# Patient Record
Sex: MALE | Race: WHITE | NOT HISPANIC OR LATINO | Employment: FULL TIME | URBAN - METROPOLITAN AREA
[De-identification: names, ages, dates, MRNs, and addresses within clinical notes are randomized per-mention and may not be internally consistent; named-entity substitution may affect disease eponyms.]

---

## 2018-02-16 ENCOUNTER — HISTORICAL (OUTPATIENT)
Dept: LAB | Facility: HOSPITAL | Age: 48
End: 2018-02-16

## 2018-02-16 LAB
ABS NEUT (OLG): 3.19 X10(3)/MCL (ref 2.1–9.2)
ALBUMIN SERPL-MCNC: 4.1 GM/DL (ref 3.4–5)
ALBUMIN/GLOB SERPL: 1.2 RATIO (ref 1.1–2)
ALP SERPL-CCNC: 71 UNIT/L (ref 50–136)
ALT SERPL-CCNC: 40 UNIT/L (ref 12–78)
AST SERPL-CCNC: 21 UNIT/L (ref 15–37)
BASOPHILS # BLD AUTO: 0 X10(3)/MCL (ref 0–0.2)
BASOPHILS NFR BLD AUTO: 1 %
BILIRUB SERPL-MCNC: 0.9 MG/DL (ref 0.2–1)
BILIRUBIN DIRECT+TOT PNL SERPL-MCNC: 0.2 MG/DL (ref 0–0.5)
BILIRUBIN DIRECT+TOT PNL SERPL-MCNC: 0.7 MG/DL (ref 0–0.8)
BUN SERPL-MCNC: 17 MG/DL (ref 7–18)
CALCIUM SERPL-MCNC: 9.3 MG/DL (ref 8.5–10.1)
CHLORIDE SERPL-SCNC: 104 MMOL/L (ref 98–107)
CHOLEST SERPL-MCNC: 228 MG/DL (ref 0–200)
CHOLEST/HDLC SERPL: 7.6 {RATIO} (ref 0–5)
CO2 SERPL-SCNC: 29 MMOL/L (ref 21–32)
CREAT SERPL-MCNC: 0.9 MG/DL (ref 0.7–1.3)
EOSINOPHIL # BLD AUTO: 0.1 X10(3)/MCL (ref 0–0.9)
EOSINOPHIL NFR BLD AUTO: 2 %
ERYTHROCYTE [DISTWIDTH] IN BLOOD BY AUTOMATED COUNT: 12.3 % (ref 11.5–17)
GLOBULIN SER-MCNC: 3.5 GM/DL (ref 2.4–3.5)
GLUCOSE SERPL-MCNC: 80 MG/DL (ref 74–106)
HCT VFR BLD AUTO: 48.5 % (ref 42–52)
HDLC SERPL-MCNC: 30 MG/DL (ref 35–60)
HGB BLD-MCNC: 15.9 GM/DL (ref 14–18)
LDLC SERPL CALC-MCNC: 162 MG/DL (ref 0–129)
LYMPHOCYTES # BLD AUTO: 1.2 X10(3)/MCL (ref 0.6–4.6)
LYMPHOCYTES NFR BLD AUTO: 24 %
MCH RBC QN AUTO: 29.9 PG (ref 27–31)
MCHC RBC AUTO-ENTMCNC: 32.8 GM/DL (ref 33–36)
MCV RBC AUTO: 91.3 FL (ref 80–94)
MONOCYTES # BLD AUTO: 0.6 X10(3)/MCL (ref 0.1–1.3)
MONOCYTES NFR BLD AUTO: 11 %
NEUTROPHILS # BLD AUTO: 3.19 X10(3)/MCL (ref 2.1–9.2)
NEUTROPHILS NFR BLD AUTO: 61 %
PLATELET # BLD AUTO: 225 X10(3)/MCL (ref 130–400)
PMV BLD AUTO: 9.5 FL (ref 9.4–12.4)
POTASSIUM SERPL-SCNC: 4.7 MMOL/L (ref 3.5–5.1)
PROT SERPL-MCNC: 7.6 GM/DL (ref 6.4–8.2)
PSA SERPL-MCNC: 1.1 NG/ML (ref 0–4)
RBC # BLD AUTO: 5.31 X10(6)/MCL (ref 4.7–6.1)
SODIUM SERPL-SCNC: 139 MMOL/L (ref 136–145)
TRIGL SERPL-MCNC: 180 MG/DL (ref 30–150)
TSH SERPL-ACNC: 2.2 MIU/ML (ref 0.36–3.74)
VLDLC SERPL CALC-MCNC: 36 MG/DL
WBC # SPEC AUTO: 5.2 X10(3)/MCL (ref 4.5–11.5)

## 2019-02-07 ENCOUNTER — HISTORICAL (OUTPATIENT)
Dept: ADMINISTRATIVE | Facility: HOSPITAL | Age: 49
End: 2019-02-07

## 2019-02-07 LAB
ABS NEUT (OLG): 4.1 X10(3)/MCL (ref 2.1–9.2)
ALBUMIN SERPL-MCNC: 4.9 GM/DL (ref 3.4–5)
ALP SERPL-CCNC: 63 UNIT/L (ref 38–126)
ALT SERPL-CCNC: 28 UNIT/L (ref 7–52)
AST SERPL-CCNC: 22 UNIT/L (ref 15–37)
BILIRUB SERPL-MCNC: 0.9 MG/DL (ref 0.2–1)
BILIRUBIN DIRECT+TOT PNL SERPL-MCNC: 0.2 MG/DL (ref 0–0.5)
BILIRUBIN DIRECT+TOT PNL SERPL-MCNC: 0.7 MG/DL
BUN SERPL-MCNC: 21 MG/DL (ref 7–18)
CALCIUM SERPL-MCNC: 9.3 MG/DL (ref 8.5–10)
CHLORIDE SERPL-SCNC: 102 MMOL/L (ref 98–107)
CHOLEST SERPL-MCNC: 239 MG/DL (ref 0–200)
CHOLEST/HDLC SERPL: 8 {RATIO}
CO2 SERPL-SCNC: 30 MMOL/L (ref 21–32)
CREAT SERPL-MCNC: 0.85 MG/DL (ref 0.6–1.3)
ERYTHROCYTE [DISTWIDTH] IN BLOOD BY AUTOMATED COUNT: 11.9 % (ref 11.5–17)
GLOBULIN SER-MCNC: 2.4 GM/DL (ref 1.2–3)
GLUCOSE SERPL-MCNC: 100 MG/DL (ref 74–106)
HCT VFR BLD AUTO: 48.4 % (ref 42–52)
HDLC SERPL-MCNC: 30 MG/DL (ref 35–60)
HGB BLD-MCNC: 15.7 GM/DL (ref 14–18)
LDLC SERPL CALC-MCNC: 166 MG/DL (ref 0–129)
LYMPHOCYTES # BLD AUTO: 1.7 X10(3)/MCL (ref 0.6–3.4)
LYMPHOCYTES NFR BLD AUTO: 26.1 % (ref 13–40)
MCH RBC QN AUTO: 31.2 PG (ref 27–31.2)
MCHC RBC AUTO-ENTMCNC: 32 GM/DL (ref 32–36)
MCV RBC AUTO: 96 FL (ref 80–94)
MONOCYTES # BLD AUTO: 0.8 X10(3)/MCL (ref 0.1–1.3)
MONOCYTES NFR BLD AUTO: 11.4 % (ref 0.1–24)
NEUTROPHILS NFR BLD AUTO: 62.5 % (ref 47–80)
PLATELET # BLD AUTO: 192 X10(3)/MCL (ref 130–400)
PMV BLD AUTO: 9.2 FL (ref 9.4–12.4)
POTASSIUM SERPL-SCNC: 4.3 MMOL/L (ref 3.5–5.1)
PROT SERPL-MCNC: 7.3 GM/DL (ref 6.4–8.2)
PSA SERPL-MCNC: 0.47 NG/ML (ref 0–3.5)
RBC # BLD AUTO: 5.03 X10(6)/MCL (ref 4.7–6.1)
SODIUM SERPL-SCNC: 137 MMOL/L (ref 136–145)
TRIGL SERPL-MCNC: 130 MG/DL (ref 30–150)
TSH SERPL-ACNC: 1.68 MIU/ML (ref 0.35–4.94)
VLDLC SERPL CALC-MCNC: 26 MG/DL
WBC # SPEC AUTO: 6.6 X10(3)/MCL (ref 4.5–11.5)

## 2020-02-10 ENCOUNTER — HISTORICAL (OUTPATIENT)
Dept: ADMINISTRATIVE | Facility: HOSPITAL | Age: 50
End: 2020-02-10

## 2020-02-10 LAB
ABS NEUT (OLG): 3.7 X10(3)/MCL (ref 2.1–9.2)
ALBUMIN SERPL-MCNC: 4.7 GM/DL (ref 3.4–5)
ALBUMIN/GLOB SERPL: 2.14 {RATIO} (ref 1.5–2.5)
ALP SERPL-CCNC: 63 UNIT/L (ref 38–126)
ALT SERPL-CCNC: 27 UNIT/L (ref 7–52)
AST SERPL-CCNC: 21 UNIT/L (ref 15–37)
BILIRUB SERPL-MCNC: 0.7 MG/DL (ref 0.2–1)
BILIRUBIN DIRECT+TOT PNL SERPL-MCNC: 0.1 MG/DL (ref 0–0.5)
BILIRUBIN DIRECT+TOT PNL SERPL-MCNC: 0.6 MG/DL
BUN SERPL-MCNC: 25 MG/DL (ref 7–18)
CALCIUM SERPL-MCNC: 8.9 MG/DL (ref 8.5–10)
CHLORIDE SERPL-SCNC: 104 MMOL/L (ref 98–107)
CHOLEST SERPL-MCNC: 217 MG/DL (ref 0–200)
CHOLEST/HDLC SERPL: 7.8 {RATIO}
CO2 SERPL-SCNC: 22 MMOL/L (ref 21–32)
CREAT SERPL-MCNC: 0.88 MG/DL (ref 0.6–1.3)
ERYTHROCYTE [DISTWIDTH] IN BLOOD BY AUTOMATED COUNT: 12.3 % (ref 11.5–17)
GLOBULIN SER-MCNC: 2.2 GM/DL (ref 1.2–3)
GLUCOSE SERPL-MCNC: 115 MG/DL (ref 74–106)
HCT VFR BLD AUTO: 47.1 % (ref 42–52)
HDLC SERPL-MCNC: 28 MG/DL (ref 35–60)
HGB BLD-MCNC: 15.5 GM/DL (ref 14–18)
LDLC SERPL CALC-MCNC: 169 MG/DL (ref 0–129)
LYMPHOCYTES # BLD AUTO: 1.5 X10(3)/MCL (ref 0.6–3.4)
LYMPHOCYTES NFR BLD AUTO: 25.2 % (ref 13–40)
MCH RBC QN AUTO: 30.1 PG (ref 27–31.2)
MCHC RBC AUTO-ENTMCNC: 33 GM/DL (ref 32–36)
MCV RBC AUTO: 92 FL (ref 80–94)
MONOCYTES # BLD AUTO: 0.9 X10(3)/MCL (ref 0.1–1.3)
MONOCYTES NFR BLD AUTO: 14.4 % (ref 0.1–24)
NEUTROPHILS NFR BLD AUTO: 60.4 % (ref 47–80)
PLATELET # BLD AUTO: 214 X10(3)/MCL (ref 130–400)
PMV BLD AUTO: 9.8 FL (ref 9.4–12.4)
POTASSIUM SERPL-SCNC: 4.5 MMOL/L (ref 3.5–5.1)
PROT SERPL-MCNC: 6.9 GM/DL (ref 6.4–8.2)
PSA SERPL-MCNC: 1.06 NG/ML (ref 0–3.5)
RBC # BLD AUTO: 5.15 X10(6)/MCL (ref 4.7–6.1)
SODIUM SERPL-SCNC: 138 MMOL/L (ref 136–145)
TRIGL SERPL-MCNC: 131 MG/DL (ref 30–150)
TSH SERPL-ACNC: 2.02 MIU/ML (ref 0.35–4.94)
VLDLC SERPL CALC-MCNC: 26.2 MG/DL
WBC # SPEC AUTO: 6.1 X10(3)/MCL (ref 4.5–11.5)

## 2021-02-24 ENCOUNTER — HISTORICAL (OUTPATIENT)
Dept: ADMINISTRATIVE | Facility: HOSPITAL | Age: 51
End: 2021-02-24

## 2021-02-24 LAB
ABS NEUT (OLG): 3.9 X10(3)/MCL (ref 2.1–9.2)
ALBUMIN SERPL-MCNC: 4.8 GM/DL (ref 3.4–5)
ALBUMIN/GLOB SERPL: 1.78 {RATIO} (ref 1.5–2.5)
ALP SERPL-CCNC: 71 UNIT/L (ref 38–126)
ALT SERPL-CCNC: 36 UNIT/L (ref 7–52)
AST SERPL-CCNC: 24 UNIT/L (ref 15–37)
BILIRUB SERPL-MCNC: 0.9 MG/DL (ref 0.2–1)
BILIRUBIN DIRECT+TOT PNL SERPL-MCNC: 0.2 MG/DL (ref 0–0.5)
BILIRUBIN DIRECT+TOT PNL SERPL-MCNC: 0.7 MG/DL
BUN SERPL-MCNC: 19 MG/DL (ref 7–18)
CALCIUM SERPL-MCNC: 9.6 MG/DL (ref 8.5–10.1)
CHLORIDE SERPL-SCNC: 102 MMOL/L (ref 98–107)
CHOLEST SERPL-MCNC: 233 MG/DL (ref 0–200)
CHOLEST/HDLC SERPL: 7.1 {RATIO}
CO2 SERPL-SCNC: 24 MMOL/L (ref 21–32)
CREAT SERPL-MCNC: 0.79 MG/DL (ref 0.6–1.3)
ERYTHROCYTE [DISTWIDTH] IN BLOOD BY AUTOMATED COUNT: 11.8 % (ref 11.5–17)
EST CREAT CLEARANCE SER (OHS): 154.87 ML/MIN
GLOBULIN SER-MCNC: 2.7 GM/DL (ref 1.2–3)
GLUCOSE SERPL-MCNC: 110 MG/DL (ref 74–106)
HCT VFR BLD AUTO: 46 % (ref 42–52)
HDLC SERPL-MCNC: 33 MG/DL (ref 35–60)
HGB BLD-MCNC: 15.5 GM/DL (ref 14–18)
LDLC SERPL CALC-MCNC: 172 MG/DL (ref 0–129)
LYMPHOCYTES # BLD AUTO: 1.5 X10(3)/MCL (ref 0.6–3.4)
LYMPHOCYTES NFR BLD AUTO: 23.6 % (ref 13–40)
MCH RBC QN AUTO: 30.6 PG (ref 27–31.2)
MCHC RBC AUTO-ENTMCNC: 34 GM/DL (ref 32–36)
MCV RBC AUTO: 91 FL (ref 80–94)
MONOCYTES # BLD AUTO: 0.9 X10(3)/MCL (ref 0.1–1.3)
MONOCYTES NFR BLD AUTO: 14.5 % (ref 0.1–24)
NEUTROPHILS NFR BLD AUTO: 61.9 % (ref 47–80)
PLATELET # BLD AUTO: 210 X10(3)/MCL (ref 130–400)
PMV BLD AUTO: 9.1 FL (ref 9.4–12.4)
POTASSIUM SERPL-SCNC: 4.2 MMOL/L (ref 3.5–5.1)
PROT SERPL-MCNC: 7.5 GM/DL (ref 6.4–8.2)
PSA SERPL-MCNC: 0.67 NG/ML (ref 0–3.5)
RBC # BLD AUTO: 5.07 X10(6)/MCL (ref 4.7–6.1)
SODIUM SERPL-SCNC: 136 MMOL/L (ref 136–145)
TRIGL SERPL-MCNC: 192 MG/DL (ref 30–150)
TSH SERPL-ACNC: 2.26 MIU/ML (ref 0.35–4.94)
VLDLC SERPL CALC-MCNC: 38.4 MG/DL
WBC # SPEC AUTO: 6.3 X10(3)/MCL (ref 4.5–11.5)

## 2021-07-28 ENCOUNTER — HISTORICAL (OUTPATIENT)
Dept: INFECTIOUS DISEASES | Facility: HOSPITAL | Age: 51
End: 2021-07-28

## 2022-04-10 ENCOUNTER — HISTORICAL (OUTPATIENT)
Dept: ADMINISTRATIVE | Facility: HOSPITAL | Age: 52
End: 2022-04-10

## 2022-04-29 VITALS
BODY MASS INDEX: 33.45 KG/M2 | SYSTOLIC BLOOD PRESSURE: 118 MMHG | HEIGHT: 68 IN | DIASTOLIC BLOOD PRESSURE: 74 MMHG | WEIGHT: 220.69 LBS

## 2022-05-02 NOTE — HISTORICAL [facility] CERNER
This is a historical note converted from [employer]. Formatting and pictures may have been removed.  Please reference Vianey for original formatting and attached multimedia. Chief Complaint  WELLNESS CPX FAST  History of Present Illness  50 year old WM presents fasting for annual wellness  PMH: HTN  ?   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  ?  Cologuard neg-2020/ Due 2023.  Review of Systems  Constitutional:?no fever, no fatigue, no weakness  Psych: no anxiety,?no?depression, no insomnia  Eye:?no vision loss, no eye redness, no drainage, or pain  ENMT:?no sore throat, no ear pain, no sinus pain/congestion  Respiratory:?no cough, no wheezing, no shortness of breath  Cardiovascular:?no chest pain, no palpitations, no edema  Gastrointestinal:?no abdominal pain, no nausea, vomiting, diarrhea or constipation  Genitourinary:?no urinary frequency,? urgency, or incontinence, no dysuria, no hematuria  Hema/Lymph:?no abnormal bruising or bleeding  Endocrine:?no heat or cold intolerance, no excessive thirst or excessive urination  Musculoskeletal:?no muscle or joint pain, no joint swelling  Integumentary:?no skin rash or abnormal lesion  Neurologic: no headache, no dizziness, no weakness or numbness  ?  ?  Physical Exam  Vitals & Measurements  T:?37? ?C (Oral)? HR:?60(Peripheral)? BP:?118/74?  HT:?172.00?cm? HT:?172?cm? WT:?100.100?kg? WT:?100.1?kg? BMI:?33.84?  General:?well-developed well-nourished in no acute distress  Eye: PERRLA, EOMI, clear conjunctiva, eyelids normal  HENT:?TMs/ear canals clear, oropharynx without erythema/exudate, oropharynx and nasal mucosal surfaces moist, no maxillary/frontal sinus tenderness to palpation  Neck: full range of motion, no thyromegaly, no?lymphadenopathy, no carotid bruits  Respiratory:?respirations even and unlabored, clear to  auscultation bilaterally  Cardiovascular:?regular rate and rhythm without murmurs, gallops or rubs  Gastrointestinal:?soft, non-tender, non-distended with normal bowel sounds, no palpable masses  Genitourinary: no CVA tenderness  Musculoskeletal:?full range of motion of all extremities/spine without limitation or discomfort  Integumentary: no rashes or skin lesions present  Neurologic: cranial nerves intact, no signs of peripheral neurological deficit, motor/sensory function intact  ?  Assessment/Plan  1.?Wellness examination?Z00.00  ?Fasting?labs completed (CBC, CMP, TSH, FLP); will call patient with results.?  ?   ?????Preventative: We discussed a continued healthy diet (healthy food choices, reduce portions and overall calorie intake), continue to exercise?at least?30-45 minutes 3x per week,?Avoid excessive alcohol.??Immunizations and preventative exams discussed.  Ordered:  CBC w/ Auto Diff, Routine collect, *Est. 02/24/21 3:00:00 CST, Blood, Order for future visit, *Est. Stop date 02/24/21 3:00:00 CST, Lab Collect, Wellness examination, 02/24/21 9:50:00 CST  Comprehensive Metabolic Panel, Routine collect, *Est. 02/24/21 3:00:00 CST, Blood, Order for future visit, *Est. Stop date 02/24/21 3:00:00 CST, Lab Collect, Wellness examination, 02/24/21 9:50:00 CST  Lab Collection Request, 02/24/21 9:50:00 CST, [facility] AMB - AFP, 02/24/21 9:50:00 CST, Wellness examination  Benign essential HTN  HLD (hyperlipidemia)  Lipid Panel, Routine collect, *Est. 02/24/21 3:00:00 CST, Blood, Order for future visit, *Est. Stop date 02/24/21 3:00:00 CST, Lab Collect, Wellness examination, 02/24/21 9:50:00 CST  Preventative Health Care Est 40-64 years [id number] PC, Wellness examination, [facility] AMB - AFP, 02/24/21 9:50:00 CST  Prostate Specific Antigen, Routine collect, *Est. 02/24/21 3:00:00 CST, Blood, Order for future visit, *Est. Stop date 02/24/21 3:00:00 CST, Lab Collect, Wellness examination, 02/24/21 9:50:00 CST  Thyroid Stimulating  Hormone, Routine collect, *Est. 02/24/21 3:00:00 CST, Blood, Order for future visit, *Est. Stop date 02/24/21 3:00:00 CST, Lab Collect, Wellness examination, 02/24/21 9:50:00 CST  ?  2.?Benign essential HTN?I10  ?Continue/refill?benazepril 40 mg daily.  Ordered:  Lab Collection Request, 02/24/21 9:50:00 CST, [facility] AMB - AFP, 02/24/21 9:50:00 CST, Wellness examination  Benign essential HTN  HLD (hyperlipidemia)  ?  3.?HLD (hyperlipidemia)?E78.5  ?Continue red rice yeast, co-Q10,?omega-3?fatty acids.  Discussed?considering CT calcium score.? Patient is not interested at this time.  Ordered:  Lab Collection Request, 02/24/21 9:50:00 CST, [facility] AMB - AFP, 02/24/21 9:50:00 CST, Wellness examination  Benign essential HTN  HLD (hyperlipidemia)  ?  Orders:  benazepril, 40 mg = 1 tab(s), Oral, Daily, # 90 tab(s), 3 Refill(s), Pharmacy: [id number], 172, cm, Height/Length Dosing, 02/24/21 9:28:00 CST, 100.1, kg, Weight Dosing, 02/24/21 9:24:00 CST  Follow-up annually, sooner as needed.   Problem List/Past Medical History  Ongoing  Benign essential HTN  HLD (hyperlipidemia)  Obesity  IRMA on CPAP  Snuff user  Historical  No qualifying data  Procedure/Surgical History  Appendectomy  BLADDER & URETHRAL STRICTURE CORRECTION   Medications  benazepril 40 mg oral tablet, 40 mg= 1 tab(s), Oral, Daily, 3 refills  CoQ10 300 mg oral capsule, 300 mg= 1 cap(s), Oral, Daily  multivitamin with minerals (Adult Tab), 1 tab(s), Oral, Daily  Omega-3 1000 mg oral capsule, 1000 mg= 1 cap(s), Oral, Daily  red yeast rice 600 mg oral capsule, 1200 mg= 2 cap(s), Oral, Daily  Allergies  penicillin?(Unknown)  sulfa drugs?(CHERRY ALANIS REACTION)  Social History  Abuse/Neglect  No, 02/24/2021  Alcohol  Current, Wine, Liquor, 1-2 times per week, 02/16/2017  Employment/School  Employed, Work/School description: [employer]., 02/16/2017  Exercise  Exercise type: Walking, Aerobics., 02/16/2017  Home/Environment  Lives with Children,  Spouse., 02/16/2017  Nutrition/Health  Regular, 02/16/2017  Sexual  Sexually active: Yes., 02/16/2017  Substance Use  Never, 02/16/2017  Tobacco  Smoker, current status unknown, Oral, No, 02/24/2021  Family History  Cirrhosis of liver: Father.  Crohns disease.: Sister and Brother.  Diabetes mellitus type 2: Brother.  Hyperlipidemia.: Mother.  Hypertension.: Father and Brother.  IRMA - Obstructive sleep apnea: Mother.  Immunizations  Vaccine Date Status   tetanus/diphtheria/pertussis, acel(Tdap) 02/10/2020 Given   Health Maintenance  Health Maintenance  ???Pending?(in the next year)  ??? ??OverDue  ??? ? ? ?TB Skin Test due??08/30/20??and every 1??year(s)  ??? ? ? ?Influenza Vaccine due??10/01/20??and every 1??day(s)  ??? ? ? ?Hypertension Management-BMP due??02/09/21??and every 1??year(s)  ??? ??Due?  ??? ? ? ?Alcohol Misuse Screening due??01/02/21??and every 1??year(s)  ??? ? ? ?ADL Screening due??02/24/21??and every 1??year(s)  ??? ? ? ?Hypertension Management-Education due??02/24/21??and every 1??year(s)  ??? ? ? ?Zoster Vaccine due??02/24/21??Unknown Frequency  ??? ??Due In Future?  ??? ? ? ?Obesity Screening not due until??01/01/22??and every 1??year(s)  ???Satisfied?(in the past 1 year)  ??? ??Satisfied?  ??? ? ? ?Aspirin Therapy for CVD Prevention on??02/24/21.??Satisfied by JONATHAN Moses Teddi  ??? ? ? ?Blood Pressure Screening on??02/24/21.??Satisfied by Sangeeta Matthews LPN  ??? ? ? ?Body Mass Index Check on??02/24/21.??Satisfied by Sangeeta Matthews LPN  ??? ? ? ?Depression Screening on??02/24/21.??Satisfied by Sangeeta Matthews LPN  ??? ? ? ?Hypertension Management-Blood Pressure on??02/24/21.??Satisfied by Sangeeta Matthews LPN  ??? ? ? ?Influenza Vaccine on??02/24/21.??Satisfied by Sangeeta Matthews LPN  ??? ? ? ?Obesity Screening on??02/24/21.??Satisfied by Sanegeta Matthews LPN  ?      Patient condition discussed?in detail with nurse practitioner.? Agree with plan of care?and follow-up.

## 2022-05-02 NOTE — HISTORICAL [facility] CERNER
This is a historical note converted from [employer]. Formatting and pictures may have been removed.  Please reference Vianey for original formatting and attached multimedia. Chief Complaint  WELLNESS CPX FAST  History of Present Illness  49 year old WM presents fasting for annual wellness  PMH: HTN  ?   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   Dentist: Dr Harrison Madrigal (sees q 6 month)  Eye exam q 2-3 years  ?   No Colonoscopy- No 1st degree relative with colon cancer  Patient not willing to do?Colonoscopy at this time, but willing to do Cologuard  Review of Systems  Constitutional:?no fever, fatigue, weakness  Eye:?no vision loss, eye redness, drainage, or pain  ENMT:?no sore throat, ear pain, sinus pain/congestion  Respiratory:?no cough, no wheezing, no shortness of breath  Cardiovascular:?no chest pain, no palpitations, no edema  Gastrointestinal:?no nausea, vomiting, or diarrhea. No abdominal pain  Genitourinary:?no dysuria, no urinary frequency or urgency, no hematuria  Hema/Lymph:?no abnormal bruising or bleeding  Endocrine:?no heat or cold intolerance, no excessive thirst or excessive urination  Musculoskeletal:?no muscle or joint pain, no joint swelling  Integumentary:?no skin rash or abnormal lesion  Neurologic: no headache, no dizziness, no weakness or numbness  Physical Exam  Vitals & Measurements  T:?37? ?C (Oral)? HR:?56(Peripheral)? BP:?118/78?  HT:?172?cm? WT:?100.6?kg? BMI:?34?  General:?well-developed well-nourished in no acute distress  Eye: PERRLA, EOMI, clear conjunctiva, eyelids normal  HENT:?TMs/ear canals clear, oropharynx without erythema/exudate, oropharynx and nasal mucosal surfaces moist, no maxillary/frontal sinus tenderness to palpation  Neck: full range of motion, no thyromegaly or lymphadenopathy  Respiratory:?clear to auscultation bilaterally  Cardiovascular:?regular rate and  rhythm without murmurs, gallops or rubs  Gastrointestinal:?soft, non-tender, non-distended with normal bowel sounds, without masses to palpation  Genitourinary: no CVA tenderness to palpation  Musculoskeletal:?full range of motion of all extremities/spine without limitation or discomfort  Integumentary: no rashes or skin lesions present  Neurologic: cranial nerves intact, no signs of peripheral neurological deficit, motor/sensory function intact  Assessment/Plan  1.?Wellness examination?Z00.00  ?LABS: CBC, CMP, TSH, FLP, PSA  Ordered:  CBC w/ Auto Diff, Routine collect, 02/10/20 7:46:00 CST, Blood, Order for future visit, Stop date 02/10/20 7:46:00 CST, Lab Collect, Wellness examination, 02/10/20 7:46:00 CST  Comprehensive Metabolic Panel, Now collect, 02/10/20 7:46:00 CST, Blood, Order for future visit, Stop date 02/10/20 7:46:00 CST, Lab Collect, Wellness examination, 02/10/20 7:46:00 CST  Lab Collection Request, 02/10/20 7:46:00 CST, [facility] AMB - AFP, 02/10/20 7:46:00 CST, Wellness examination  Lipid Panel, Routine collect, 02/10/20 7:46:00 CST, Blood, Order for future visit, Stop date 02/10/20 7:46:00 CST, Lab Collect, Wellness examination, 02/10/20 7:46:00 CST  Preventative Health Care Est 40-64 years [id number] PC, Wellness examination, [facility] AMB - AFP, 02/10/20 7:46:00 CST  Thyroid Stimulating Hormone, Routine collect, 02/10/20 7:46:00 CST, Blood, Order for future visit, Stop date 02/10/20 7:46:00 CST, Lab Collect, Wellness examination, 02/10/20 7:46:00 CST  ?  2.?Benign essential HTN?I10  ?Continue Benazepril 40mg PO q day  ?  3.?IRMA on CPAP?G47.33  ?Continue with CPAP  ?  4.?Encounter for prostate cancer screening?Z12.5  Ordered:  Prostate Specific Antigen, Routine collect, 02/10/20 7:46:00 CST, Blood, Order for future visit, Stop date 02/10/20 7:46:00 CST, Lab Collect, Encounter for prostate cancer screening, 02/10/20 7:46:00 CST  ?  Cologuard Ordered   Problem List/Past Medical History  Ongoing  Benign  essential HTN  Obesity  IRMA on CPAP  Historical  No qualifying data  Procedure/Surgical History  Appendectomy  BLADDER & URETHRAL STRICTURE CORRECTION   Medications  benazepril 40 mg oral tablet, 40 mg= 1 tab(s), Oral, Daily, 3 refills  Allergies  penicillin?(Unknown)  sulfa drugs?(CHERRY THAPA REACTION)  Social History  Abuse/Neglect  No, 02/10/2020  Alcohol  Current, Wine, Liquor, 1-2 times per week, 02/16/2017  Employment/School  Employed, Work/School description: [employer]., 02/16/2017  Exercise  Exercise type: Walking, Aerobics., 02/16/2017  Home/Environment  Lives with Children, Spouse., 02/16/2017  Nutrition/Health  Regular, 02/16/2017  Sexual  Sexually active: Yes., 02/16/2017  Substance Use  Never, 02/16/2017  Tobacco  Smoker, current status unknown, Oral, No, 02/10/2020  Smoker, current status unknown, Oral, No, 02/07/2019  Former smoker, 02/16/2017  Family History  Cirrhosis of liver: Father.  Crohns disease.: Sister and Brother.  Diabetes mellitus type 2: Brother.  Hypertension.: Father and Brother.  IRMA - Obstructive sleep apnea: Mother.  Immunizations  Vaccine Date Status   tetanus/diphtheria/pertussis, acel(Tdap) 02/10/2020 Given   Health Maintenance  Health Maintenance  ???Pending?(in the next year)  ??? ??OverDue  ??? ? ? ?Diabetes Screening due??and every?  ??? ? ? ?Smoking Cessation due??01/01/20??Variable frequency  ??? ? ? ?Hypertension Management-BMP due??02/07/20??and every 1??year(s)  ??? ??Due?  ??? ? ? ?Alcohol Misuse Screening due??01/01/20??and every 1??year(s)  ??? ? ? ?ADL Screening due??02/10/20??and every 1??year(s)  ??? ? ? ?Aspirin Therapy for CVD Prevention due??02/10/20??and every 1??year(s)  ??? ? ? ?Colorectal Screening due??02/10/20??and every?  ??? ? ? ?Hypertension Management-Education due??02/10/20??and every 1??year(s)  ??? ??Due In Future?  ??? ? ? ?TB Skin Test not due until??08/30/20??and every 1??year(s)  ??? ? ? ?Obesity Screening not due until??01/01/21??and  every 1??year(s)  ??? ? ? ?Blood Pressure Screening not due until??02/09/21??and every 1??year(s)  ??? ? ? ?Body Mass Index Check not due until??02/09/21??and every 1??year(s)  ??? ? ? ?Depression Screening not due until??02/09/21??and every 1??year(s)  ??? ? ? ?Hypertension Management-Blood Pressure not due until??02/09/21??and every 1??year(s)  ???Satisfied?(in the past 1 year)  ??? ??Satisfied?  ??? ? ? ?Blood Pressure Screening on??02/10/20.??Satisfied by Sangeeta Matthews LPN  ??? ? ? ?Body Mass Index Check on??02/10/20.??Satisfied by Sangeeta Matthews LPN  ??? ? ? ?Depression Screening on??02/10/20.??Satisfied by Sangeeta Matthews LPN  ??? ? ? ?Hypertension Management-Blood Pressure on??02/10/20.??Satisfied by Sangeeta Matthews LPN  ??? ? ? ?Obesity Screening on??02/10/20.??Satisfied by Sangeeta Matthews LPN  ??? ? ? ?TB Skin Test on??08/30/19.  ??? ? ? ?Tetanus Vaccine on??02/10/20.??Satisfied by Sangeeta Matthews LPN  ?      Patient condition discussed?in detail with nurse practitioner.? Agree with plan of care?and follow-up.

## 2022-05-02 NOTE — HISTORICAL [facility] CERNER
This is a historical note converted from [employer]. Formatting and pictures may have been removed.  Please reference Vianey for original formatting and attached multimedia. Chief Complaint  NP WELLNESS CPX FAST REFILL MEDS  History of Present Illness  48 year old WM presents fasting for annual wellness  PMH: HTN  ?  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  No Colonoscopy to date  Review of Systems  Constitutional:?no fever, fatigue, weakness  Eye:?no vision loss, eye redness, drainage, or pain  ENMT:?no sore throat, ear pain, sinus pain/congestion, nasal congestion/drainage  Respiratory:?no cough, no wheezing, no shortness of breath  Cardiovascular:?no chest pain, no palpitations, no edema  Gastrointestinal:?no nausea, vomiting, or diarrhea. No abdominal pain  Genitourinary:?no dysuria, no urinary frequency or urgency, no hematuria  Hema/Lymph:?no abnormal bruising or bleeding  Endocrine:?no heat or cold intolerance, no excessive thirst or excessive urination  Musculoskeletal:?no muscle or joint pain, no joint swelling  Integumentary:?no skin rash or abnormal lesion  Neurologic: no headache, no dizziness, no weakness or numbness  ?  Physical Exam  Vitals & Measurements  T:?37.1? ?C (Oral)? HR:?64(Peripheral)? BP:?126/82?  HT:?172?cm? WT:?102.7?kg? BMI:?34.71?  General:?well-developed well-nourished in no acute distress  Eye: PERRLA, EOMI, clear conjunctiva, eyelids normal  HENT:?TMs/ear canals clear, oropharynx without erythema/exudate, oropharynx and nasal mucosal surfaces moist, no maxillary/frontal sinus tenderness to palpation  Neck: full range of motion, no thyromegaly or lymphadenopathy  Respiratory:?clear to auscultation bilaterally  Cardiovascular:?regular rate and rhythm without murmurs, gallops or rubs  Gastrointestinal:?soft, non-tender, non-distended with normal bowel sounds, without masses to  palpation  Genitourinary: no CVA tenderness to palpation  Musculoskeletal:?full range of motion of all extremities/spine without limitation or discomfort  Integumentary: no rashes or skin lesions present  Neurologic: cranial nerves intact, no signs of peripheral neurological deficit, motor/sensory function intact  ?  Assessment/Plan  1.?Wellness examination?Z00.00  ?LABS: CBC, CMP, TSH, FLP, PSA  Ordered:  CBC w/ Auto Diff, Routine collect, 02/07/19 10:30:00 CST, Blood, Order for future visit, Stop date 02/07/19 10:30:00 CST, Lab Collect, Wellness examination, 02/07/19 10:30:00 CST  Comprehensive Metabolic Panel, Now collect, 02/07/19 10:30:00 CST, Blood, Order for future visit, Stop date 02/07/19 10:30:00 CST, Lab Collect, Wellness examination, 02/07/19 10:30:00 CST  Lab Collection Request, 02/07/19 10:30:00 CST, [facility]LINK AMB - AFP, 02/07/19 10:30:00 CST  Lipid Panel, Routine collect, 02/07/19 10:30:00 CST, Blood, Order for future visit, Stop date 02/07/19 10:30:00 CST, Lab Collect, Wellness examination, 02/07/19 10:30:00 CST  Preventative Health Care New 40-64 years [id number] PC, Wellness examination, [facility] AMB - AFP, 02/07/19 10:30:00 CST  Prostate Specific Antigen, Routine collect, 02/07/19 10:30:00 CST, Blood, Order for future visit, Stop date 02/07/19 10:30:00 CST, Lab Collect, Wellness examination, 02/07/19 10:30:00 CST  Thyroid Stimulating Hormone, Routine collect, 02/07/19 10:30:00 CST, Blood, Order for future visit, Stop date 02/07/19 10:30:00 CST, Lab Collect, Wellness examination, 02/07/19 10:30:00 CST  ?  2.?Benign essential HTN?I10  Continue Benazepril 40mg PO q day (90,1)  Ordered:  Clinic Follow up, *Est. 08/07/19 3:00:00 CDT, Order for future visit, Benign essential HTN, [facility]  ?  3.?IRMA on CPAP?G47.33  ?Continue with CPAP  ?  4.?Prostate cancer screening?Z12.5  ?PSA today  ?  Colon cancer screening?Z12.11  Ordered:  External Referral, Screening Colonoscopy, GI, 02/07/19 10:33:00 CST, Colon cancer  screening  ?   Problem List/Past Medical History  Ongoing  Benign essential HTN  Obesity  IRMA on CPAP  Historical  No qualifying data  Procedure/Surgical History  Appendectomy  BLADDER & URETHRAL STRICTURE CORRECTION   Medications  benazepril 40 mg oral tablet, 40 mg= 1 tab(s), Oral, Daily, 3 refills  Allergies  penicillin?(Unknown)  sulfa drugs?(CHERRY THAPA REACTION)  Social History  Alcohol  Current, Wine, Liquor, 1-2 times per week, 02/16/2017  Employment/School  Employed, Work/School description: [employer]., 02/16/2017  Exercise  Exercise type: Walking, Aerobics., 02/16/2017  Home/Environment  Lives with Children, Spouse., 02/16/2017  Nutrition/Health  Regular, 02/16/2017  Sexual  Sexually active: Yes., 02/16/2017  Substance Abuse  Never, 02/16/2017  Tobacco  Smoker, current status unknown, Oral, No, 02/07/2019  Former smoker, 02/16/2017  Family History  Cirrhosis of liver: Father.  Crohns disease.: Sister and Brother.  Diabetes mellitus type 2: Brother.  Hypertension.: Father and Brother.  IRMA - Obstructive sleep apnea: Mother.  Health Maintenance  Health Maintenance  ???Pending?(in the next year)  ??? ??OverDue  ??? ? ? ?Diabetes Screening due??and every?  ??? ??Due?  ??? ? ? ?ADL Screening due??02/07/19??and every 1??year(s)  ??? ? ? ?Alcohol Misuse Screening due??02/07/19??and every 1??year(s)  ??? ? ? ?Aspirin Therapy for CVD Prevention due??02/07/19??and every 1??year(s)  ??? ? ? ?Colorectal Screening due??02/07/19??and every?  ??? ? ? ?Hypertension Management-Education due??02/07/19??and every 1??year(s)  ??? ? ? ?Smoking Cessation due??02/07/19??Variable frequency  ??? ? ? ?TB Skin Test due??02/07/19??Variable frequency  ??? ? ? ?Tetanus Vaccine due??02/07/19??and every 10??year(s)  ??? ??Due In Future?  ??? ? ? ?Hypertension Management-BMP not due until??02/16/19??and every 1??year(s)  ???Satisfied?(in the past 1 year)  ??? ??Satisfied?  ??? ? ? ?Blood Pressure Screening  on??02/07/19.??Satisfied by Sangeeta Matthews LPN  ??? ? ? ?Body Mass Index Check on??02/07/19.??Satisfied by Sangeeta Matthews LPN  ??? ? ? ?Depression Screening on??02/07/19.??Satisfied by Sangeeta Matthews LPN  ??? ? ? ?Diabetes Screening on??02/16/18.??Satisfied by Rocío Madrigal MT  ??? ? ? ?Hypertension Management-Blood Pressure on??02/07/19.??Satisfied by Sangeeta Matthews LPN  ??? ? ? ?Lipid Screening on??02/16/18.??Satisfied by Rocío Madrigal MT  ??? ? ? ?Obesity Screening on??02/07/19.??Satisfied by Sangeeta Matthews LPN  ?  ?

## 2023-03-20 PROBLEM — L30.4 INTERTRIGO: Status: ACTIVE | Noted: 2023-03-20

## 2023-03-20 PROBLEM — G47.33 OBSTRUCTIVE SLEEP APNEA SYNDROME: Status: ACTIVE | Noted: 2023-03-20

## 2023-03-20 PROBLEM — E66.9 OBESITY: Status: ACTIVE | Noted: 2023-03-20

## 2023-03-20 PROBLEM — E78.5 HYPERLIPIDEMIA: Status: ACTIVE | Noted: 2023-03-20

## 2023-03-20 PROBLEM — Z72.0 SNUFF USER: Status: ACTIVE | Noted: 2023-03-20

## 2023-03-20 PROBLEM — Z72.0 SNUFF USER: Status: RESOLVED | Noted: 2023-03-20 | Resolved: 2023-03-20

## 2023-03-20 PROBLEM — Z00.00 WELLNESS EXAMINATION: Status: ACTIVE | Noted: 2023-03-20

## 2023-03-20 PROBLEM — I10 BENIGN ESSENTIAL HYPERTENSION: Status: ACTIVE | Noted: 2023-03-20

## 2023-06-19 PROBLEM — Z00.00 WELLNESS EXAMINATION: Status: RESOLVED | Noted: 2023-03-20 | Resolved: 2023-06-19

## 2024-03-21 PROBLEM — E66.01 SEVERE OBESITY (BMI 35.0-39.9) WITH COMORBIDITY: Status: ACTIVE | Noted: 2023-03-20

## 2024-03-21 PROBLEM — E78.2 MIXED HYPERLIPIDEMIA: Status: ACTIVE | Noted: 2023-03-20

## 2024-03-21 PROBLEM — E55.9 VITAMIN D DEFICIENCY: Status: ACTIVE | Noted: 2024-03-21

## 2024-03-21 PROBLEM — E66.01 SEVERE OBESITY (BMI 35.0-39.9) WITH COMORBIDITY: Status: RESOLVED | Noted: 2023-03-20 | Resolved: 2024-03-21

## 2024-03-21 PROCEDURE — 86803 HEPATITIS C AB TEST: CPT | Performed by: FAMILY MEDICINE
